# Patient Record
Sex: FEMALE | URBAN - METROPOLITAN AREA
[De-identification: names, ages, dates, MRNs, and addresses within clinical notes are randomized per-mention and may not be internally consistent; named-entity substitution may affect disease eponyms.]

---

## 2023-08-24 ENCOUNTER — WEB ENCOUNTER (OUTPATIENT)
Dept: URBAN - METROPOLITAN AREA CLINIC 80 | Facility: CLINIC | Age: 53
End: 2023-08-24

## 2023-08-25 ENCOUNTER — WEB ENCOUNTER (OUTPATIENT)
Dept: URBAN - METROPOLITAN AREA CLINIC 80 | Facility: CLINIC | Age: 53
End: 2023-08-25

## 2023-08-25 ENCOUNTER — OFFICE VISIT (OUTPATIENT)
Dept: URBAN - METROPOLITAN AREA CLINIC 80 | Facility: CLINIC | Age: 53
End: 2023-08-25
Payer: COMMERCIAL

## 2023-08-25 ENCOUNTER — DASHBOARD ENCOUNTERS (OUTPATIENT)
Age: 53
End: 2023-08-25

## 2023-08-25 VITALS
HEIGHT: 71 IN | DIASTOLIC BLOOD PRESSURE: 89 MMHG | HEART RATE: 65 BPM | SYSTOLIC BLOOD PRESSURE: 134 MMHG | WEIGHT: 244 LBS | TEMPERATURE: 98.1 F | BODY MASS INDEX: 34.16 KG/M2

## 2023-08-25 DIAGNOSIS — Z12.11 COLON CANCER SCREENING: ICD-10-CM

## 2023-08-25 DIAGNOSIS — K21.9 GASTROESOPHAGEAL REFLUX DISEASE, UNSPECIFIED WHETHER ESOPHAGITIS PRESENT: ICD-10-CM

## 2023-08-25 PROBLEM — 235595009: Status: ACTIVE | Noted: 2023-08-25

## 2023-08-25 PROCEDURE — 99203 OFFICE O/P NEW LOW 30 MIN: CPT | Performed by: STUDENT IN AN ORGANIZED HEALTH CARE EDUCATION/TRAINING PROGRAM

## 2023-08-25 RX ORDER — FAMOTIDINE 10 MG/1
1 TABLET AS NEEDED TABLET, FILM COATED ORAL TWICE A DAY
Status: ACTIVE | COMMUNITY

## 2023-08-25 RX ORDER — LEVOTHYROXINE SODIUM 50 UG/1
TAKE ONE TABLET BY MOUTH EVERY MORNING TABLET ORAL
Qty: 90 UNSPECIFIED | Refills: 1 | Status: ACTIVE | COMMUNITY

## 2023-08-25 RX ORDER — SODIUM, POTASSIUM,MAG SULFATES 17.5-3.13G
177ML SOLUTION, RECONSTITUTED, ORAL ORAL
Qty: 1 | OUTPATIENT
Start: 2023-08-25 | End: 2023-08-27

## 2023-08-25 NOTE — HPI-TODAY'S VISIT:
Ms. Koch is a 53yoF with history of GERD, HTN, hypothyroidism, breast cancer, and skin cancer (BCC) who presents to discuss colon cancer screening. Pt has never had a colonoscopy or stool based screening. At the beginning of this year, Ms. Koch began a keto diet. Her previous constiaption resolved and is now rare. She has lost approximately 34 lbs.  She previously had rectal bleeding attributed to her constipation and hemorrhoids; however, she has not seen this since her dietary changes. She does use preparation H daily. Pt denies any abdominal pain or history of colon cancer or colon polyps. Pt has no history of abdominal surgeries or cardiac disease and does not take any blood thinners/anticoagulation.  15-20 years ago she was having night time reflux and heart burn and underwent EGD. She does not recall the results but was started on Pantoprazole at that time which she took daily until recently. With recent weight loss, she wanted to stop her PPI, unfortunately her symptoms did return in more mild manner. She has been well controlled on once daily Famotidine.

## 2023-08-25 NOTE — PHYSICAL EXAM CONSTITUTIONAL:
Overweight, alert,  well nourished , in no acute distress , ambulating without difficulty , normal communication ability

## 2023-09-06 ENCOUNTER — OUT OF OFFICE VISIT (OUTPATIENT)
Dept: URBAN - METROPOLITAN AREA SURGERY CENTER 19 | Facility: SURGERY CENTER | Age: 53
End: 2023-09-06
Payer: COMMERCIAL

## 2023-09-06 ENCOUNTER — CLAIMS CREATED FROM THE CLAIM WINDOW (OUTPATIENT)
Dept: URBAN - METROPOLITAN AREA CLINIC 4 | Facility: CLINIC | Age: 53
End: 2023-09-06
Payer: COMMERCIAL

## 2023-09-06 DIAGNOSIS — K31.89 ACHYLIA: ICD-10-CM

## 2023-09-06 DIAGNOSIS — K31.7 BENIGN GASTRIC POLYP: ICD-10-CM

## 2023-09-06 DIAGNOSIS — K21.00 ALKALINE REFLUX ESOPHAGITIS: ICD-10-CM

## 2023-09-06 DIAGNOSIS — Z12.11 COLON CANCER SCREENING (HIGH RISK): ICD-10-CM

## 2023-09-06 DIAGNOSIS — D13.1 FUNDIC GLAND POLYPS OF STOMACH, BENIGN: ICD-10-CM

## 2023-09-06 DIAGNOSIS — K57.30 DIVERTICULOSIS OF THE COLON: ICD-10-CM

## 2023-09-06 DIAGNOSIS — K31.7 POLYP OF STOMACH AND DUODENUM: ICD-10-CM

## 2023-09-06 DIAGNOSIS — Z12.11 COLON CANCER SCREENING: ICD-10-CM

## 2023-09-06 DIAGNOSIS — K20.80 ESOPHAGITIS, LOS ANGELES GRADE B: ICD-10-CM

## 2023-09-06 PROCEDURE — G8907 PT DOC NO EVENTS ON DISCHARG: HCPCS | Performed by: STUDENT IN AN ORGANIZED HEALTH CARE EDUCATION/TRAINING PROGRAM

## 2023-09-06 PROCEDURE — 88312 SPECIAL STAINS GROUP 1: CPT | Performed by: PATHOLOGY

## 2023-09-06 PROCEDURE — 00813 ANES UPR LWR GI NDSC PX: CPT | Performed by: NURSE ANESTHETIST, CERTIFIED REGISTERED

## 2023-09-06 PROCEDURE — 45378 DIAGNOSTIC COLONOSCOPY: CPT | Performed by: STUDENT IN AN ORGANIZED HEALTH CARE EDUCATION/TRAINING PROGRAM

## 2023-09-06 PROCEDURE — 88305 TISSUE EXAM BY PATHOLOGIST: CPT | Performed by: PATHOLOGY

## 2023-09-06 PROCEDURE — 43239 EGD BIOPSY SINGLE/MULTIPLE: CPT | Performed by: STUDENT IN AN ORGANIZED HEALTH CARE EDUCATION/TRAINING PROGRAM

## 2023-09-06 RX ORDER — LEVOTHYROXINE SODIUM 50 UG/1
TAKE ONE TABLET BY MOUTH EVERY MORNING TABLET ORAL
Qty: 90 UNSPECIFIED | Refills: 1 | Status: ACTIVE | COMMUNITY

## 2023-09-06 RX ORDER — FAMOTIDINE 10 MG/1
1 TABLET AS NEEDED TABLET, FILM COATED ORAL TWICE A DAY
Status: ACTIVE | COMMUNITY

## 2023-09-14 ENCOUNTER — TELEPHONE ENCOUNTER (OUTPATIENT)
Dept: URBAN - METROPOLITAN AREA CLINIC 80 | Facility: CLINIC | Age: 53
End: 2023-09-14

## 2025-03-10 ENCOUNTER — WEB ENCOUNTER (OUTPATIENT)
Dept: URBAN - METROPOLITAN AREA CLINIC 80 | Facility: CLINIC | Age: 55
End: 2025-03-10

## 2025-06-03 ENCOUNTER — TELEPHONE ENCOUNTER (OUTPATIENT)
Dept: URBAN - METROPOLITAN AREA CLINIC 23 | Facility: CLINIC | Age: 55
End: 2025-06-03